# Patient Record
Sex: MALE | Race: WHITE | Employment: FULL TIME | URBAN - NONMETROPOLITAN AREA
[De-identification: names, ages, dates, MRNs, and addresses within clinical notes are randomized per-mention and may not be internally consistent; named-entity substitution may affect disease eponyms.]

---

## 2021-12-02 ENCOUNTER — HOSPITAL ENCOUNTER (EMERGENCY)
Age: 31
Discharge: HOME | End: 2021-12-02
Attending: EMERGENCY MEDICINE
Payer: COMMERCIAL

## 2021-12-02 VITALS
RESPIRATION RATE: 16 BRPM | SYSTOLIC BLOOD PRESSURE: 143 MMHG | DIASTOLIC BLOOD PRESSURE: 91 MMHG | TEMPERATURE: 98.6 F | HEART RATE: 96 BPM | BODY MASS INDEX: 29.8 KG/M2 | WEIGHT: 220 LBS | HEIGHT: 72 IN | OXYGEN SATURATION: 96 %

## 2021-12-02 DIAGNOSIS — S56.921A: ICD-10-CM

## 2021-12-02 DIAGNOSIS — S51.011A LACERATION OF RIGHT ELBOW, INITIAL ENCOUNTER: Primary | ICD-10-CM

## 2021-12-02 PROCEDURE — 99283 EMERGENCY DEPT VISIT LOW MDM: CPT

## 2021-12-02 PROCEDURE — 96372 THER/PROPH/DIAG INJ SC/IM: CPT

## 2021-12-02 PROCEDURE — 2580000003 HC RX 258

## 2021-12-02 PROCEDURE — 6360000002 HC RX W HCPCS: Performed by: EMERGENCY MEDICINE

## 2021-12-02 PROCEDURE — 12001 RPR S/N/AX/GEN/TRNK 2.5CM/<: CPT

## 2021-12-02 RX ORDER — CEFAZOLIN SODIUM 1 G/3ML
1000 INJECTION, POWDER, FOR SOLUTION INTRAMUSCULAR; INTRAVENOUS ONCE
Status: COMPLETED | OUTPATIENT
Start: 2021-12-02 | End: 2021-12-02

## 2021-12-02 RX ADMIN — CEFAZOLIN SODIUM 1000 MG: 1 INJECTION, POWDER, FOR SOLUTION INTRAMUSCULAR; INTRAVENOUS at 11:36

## 2021-12-02 RX ADMIN — WATER 2.5 ML: 1 INJECTION INTRAMUSCULAR; INTRAVENOUS; SUBCUTANEOUS at 11:37

## 2021-12-02 NOTE — ED NOTES
Writer to room, wound dressed and discharge instructions given at this time. No further questions from patient. Pt instructed to go to [facility] for further treatment address and directions given. His supervisor from work is here to take him.      Pam Sands RN  12/02/21 0448

## 2021-12-02 NOTE — ED NOTES
Pt presented to ED with lac to right upper arm. He stated that he was at work and a piece of steel came out of the machine and cut him. Pt able to move extremity appropriately, pulses present. Denies pain.        Jannet Goodwin RN  12/02/21 1127

## 2022-04-15 NOTE — ED PROVIDER NOTES
[address]  [address] [facility] [address]  Phone: [phone]    eMERGENCY dEPARTMENT eNCOUnter           [address] [facility]       Chief Complaint   Patient presents with    Laceration       Nurses Notes reviewed and I agree except as noted in the HPI. HISTORY OF PRESENT ILLNESS    Roxi Jacques is a 32 y.o. male who presented via private vehicle. Chief complaint: Right elbow laceration. He sustained work injury prior to arrival.  He said that a sharp piece of steel bounced and hit his right elbow. He sustained a deep laceration. He is complaining of mild sharp intermittent right elbow pain which is worse with movement. He denies focal weakness or numbness. He denies other injuries. He is up-to-date on his vaccines. REVIEW OF SYSTEMS     None except as mentioned above    PAST MEDICAL HISTORY    has no past medical history on file. SURGICAL HISTORY      has a past surgical history that includes [place] tooth extraction. CURRENT MEDICATIONS       Previous Medications    No medications on file       ALLERGIES     has No Known Allergies. FAMILY HISTORY     has no family status information on file. family history is not on file. SOCIAL HISTORY      reports that he has been smoking cigarettes. He has been smoking about 0.50 packs per day. He does not have any smokeless tobacco history on file. He reports that he does not use drugs. PHYSICAL EXAM     INITIAL VITALS:  height is 6' (1.829 m) and weight is 220 lb (99.8 kg). His temperature is 98.6 °F (37 °C). His blood pressure is 143/91 (abnormal) and his pulse is 96. His respiration is 16 and oxygen saturation is 96%. Physical Exam  Constitutional:       General: He is not in acute distress. Appearance: He is not ill-appearing. HENT:      Head: Atraumatic. Genitourinary:     Comments: Semination of right elbow showed 2 cm laceration involving the extensor radial aspect of the elbow.   The MD Harman Garcia MD  12/02/21 1146 NI Milton RN